# Patient Record
(demographics unavailable — no encounter records)

---

## 2024-11-07 NOTE — HISTORY OF PRESENT ILLNESS
[Disease:__________________________] : Disease: [unfilled] [Date: ____________] : Patient's last distress assessment performed on [unfilled]. [0 - No Distress] : Distress Level: 0 [50: Requires considerable assistance and frequent medical care.] : 50: Requires considerable assistance and frequent medical care. [50: Gets dressed, but lies around much of the day; no active play; able to participate in all quite play and activites.] : 50: Gets dressed, but lies around much of the day; no active play; able to participate in all quite play and activities. [ECOG Performance Status: 4 - Completely disabled. Cannot carry on any self care. Totally confined to bed or chair] : Performance Status: 4 - Completely disabled. Cannot carry on any self care. Totally confined to bed or chair [de-identified] : 89-year-old female sent to hematology service. No referring physician is stated in EM at the time of visit. 20 November 2023 Patient had weakness right side, and she was diagnosed to have a stoke and a seizure. She was given antiseizure medication vimpat. She needs full assistance with all activities of daily living including hygiene, feeding dressing and tranportation. . She was admitted to [facility] and eventually sent to rehabilitation care: [facility]. December 1, 2023, patient had an acute venous thrombosis right peroneal and right calf soleal vein; she was noted to have a thrombosis of the left peroneal and soleal vein. She was given enoxaparin and was later noted to have a hematoma in the abdominal wall and a right breast mass on imaging in October 2023. She was taking apixaban 5 mg PO BID changed in April 2024 to Eliquis 2.5 mg PO BID; and Depakote for seizure control. There is no bleeding at home on Eliquis.  The patient was also noted to have an abnormal mammogram showing abnormality of right breast.  [FreeTextEntry1] : apixaban 2.5 mg PO BID [de-identified] : 11/07/2024 patient seen in telehealth visit: telehealth visit made with one week notification. Daughter Viola provides the consent. Location of patient at home. location of provider at [facility]. prior history she was admitted to [facility] in March 2024 and [facility] 19 April 2024 .no blood transfusion. Now Eliquis 2.5 mg PO BID; blood thinner used for suspect pulmonary embolism Diagnosis of pulmonary embolism right pulmonary artery 20 April 2024.No bleeding noted while on Eliquis. She is at bed rest at home and no ambulation has been tolerated by the patient. No hospitalization

## 2024-11-07 NOTE — CONSULT LETTER
[Dear  ___] : Dear  [unfilled], [Consult Letter:] : I had the pleasure of evaluating your patient, [unfilled]. [Please see my note below.] : Please see my note below. [Consult Closing:] : Thank you very much for allowing me to participate in the care of this patient.  If you have any questions, please do not hesitate to contact me. [Sincerely,] : Sincerely, [FreeTextEntry2] : Myrna Gonzalez [address] [id number] [FreeTextEntry3] : Jens Reyes MD

## 2024-11-07 NOTE — REVIEW OF SYSTEMS
[Vision Problems] : vision problems [Diarrhea: Grade 0] : Diarrhea: Grade 0 [Confused] : confusion [Difficulty Walking] : difficulty walking [Negative] : Allergic/Immunologic [Easy Bleeding] : no tendency for easy bleeding [Easy Bruising] : no tendency for easy bruising [Swollen Glands] : no swollen glands [FreeTextEntry3] : wears glasses [de-identified] : non verbal history of dementia

## 2024-11-07 NOTE — RESULTS/DATA
[FreeTextEntry1] : review of data in EMHR including US duplex 12/01/2023 CBC WBC 5.84 HGB 13.5 g/dL MCV 79  000

## 2024-11-07 NOTE — CONSULT LETTER
[Dear  ___] : Dear  [unfilled], [Consult Letter:] : I had the pleasure of evaluating your patient, [unfilled]. [Please see my note below.] : Please see my note below. [Consult Closing:] : Thank you very much for allowing me to participate in the care of this patient.  If you have any questions, please do not hesitate to contact me. [Sincerely,] : Sincerely, [FreeTextEntry2] : Myrna Gonzalez [address] [id number] [FreeTextEntry3] : Jens eRyes MD

## 2024-11-07 NOTE — REVIEW OF SYSTEMS
[Vision Problems] : vision problems [Diarrhea: Grade 0] : Diarrhea: Grade 0 [Confused] : confusion [Difficulty Walking] : difficulty walking [Negative] : Allergic/Immunologic [Easy Bleeding] : no tendency for easy bleeding [Easy Bruising] : no tendency for easy bruising [Swollen Glands] : no swollen glands [FreeTextEntry3] : wears glasses [de-identified] : non verbal history of dementia

## 2024-11-07 NOTE — PHYSICAL EXAM
[Completely disabled. Cannot carry on any self care. Totally confined to bed or chair] : Status 4- Completely disabled. Cannot carry on any self care. Totally confined to bed or chair [Thin] : thin [Normal] : affect appropriate [de-identified] : non communicative female seen in bed [de-identified] : diffuse weakness [de-identified] : non communication [de-identified] : dementia

## 2024-11-07 NOTE — REASON FOR VISIT
[Initial Consultation] : an initial consultation for [Blood Count Assessment] : blood count assessment [Family Member] : family member [FreeTextEntry2] : pulmonary embolism April 2024

## 2024-11-07 NOTE — HISTORY OF PRESENT ILLNESS
[Disease:__________________________] : Disease: [unfilled] [Date: ____________] : Patient's last distress assessment performed on [unfilled]. [0 - No Distress] : Distress Level: 0 [50: Requires considerable assistance and frequent medical care.] : 50: Requires considerable assistance and frequent medical care. [50: Gets dressed, but lies around much of the day; no active play; able to participate in all quite play and activites.] : 50: Gets dressed, but lies around much of the day; no active play; able to participate in all quite play and activities. [ECOG Performance Status: 4 - Completely disabled. Cannot carry on any self care. Totally confined to bed or chair] : Performance Status: 4 - Completely disabled. Cannot carry on any self care. Totally confined to bed or chair [de-identified] : 89-year-old female sent to hematology service. No referring physician is stated in EM at the time of visit. 20 November 2023 Patient had weakness right side, and she was diagnosed to have a stoke and a seizure. She was given antiseizure medication vimpat. She needs full assistance with all activities of daily living including hygiene, feeding dressing and tranportation. . She was admitted to [facility] and eventually sent to rehabilitation care: [facility]. December 1, 2023, patient had an acute venous thrombosis right peroneal and right calf soleal vein; she was noted to have a thrombosis of the left peroneal and soleal vein. She was given enoxaparin and was later noted to have a hematoma in the abdominal wall and a right breast mass on imaging in October 2023. She was taking apixaban 5 mg PO BID changed in April 2024 to Eliquis 2.5 mg PO BID; and Depakote for seizure control. There is no bleeding at home on Eliquis.  The patient was also noted to have an abnormal mammogram showing abnormality of right breast.  [FreeTextEntry1] : apixaban 2.5 mg PO BID [de-identified] : 11/07/2024 patient seen in telehealth visit: telehealth visit made with one week notification. Daughter Viola provides the consent. Location of patient at home. location of provider at [facility]. prior history she was admitted to [facility] in March 2024 and [facility] 19 April 2024 .no blood transfusion. Now Eliquis 2.5 mg PO BID; blood thinner used for suspect pulmonary embolism Diagnosis of pulmonary embolism right pulmonary artery 20 April 2024.No bleeding noted while on Eliquis. She is at bed rest at home and no ambulation has been tolerated by the patient. No hospitalization

## 2024-11-07 NOTE — PHYSICAL EXAM
[Completely disabled. Cannot carry on any self care. Totally confined to bed or chair] : Status 4- Completely disabled. Cannot carry on any self care. Totally confined to bed or chair [Thin] : thin [Normal] : affect appropriate [de-identified] : non communicative female seen in bed [de-identified] : diffuse weakness [de-identified] : non communication [de-identified] : dementia

## 2024-11-07 NOTE — END OF VISIT
[Time Spent: ___ minutes] : I have spent [unfilled] minutes of time on the encounter which excludes teaching and separately reported services.
Negative

## 2024-11-07 NOTE — CONSULT LETTER
[Dear  ___] : Dear  [unfilled], [Consult Letter:] : I had the pleasure of evaluating your patient, [unfilled]. [Please see my note below.] : Please see my note below. [Consult Closing:] : Thank you very much for allowing me to participate in the care of this patient.  If you have any questions, please do not hesitate to contact me. [Sincerely,] : Sincerely, [FreeTextEntry2] : Myrna Gonzalez [address] [id number] [FreeTextEntry3] : Jens Reyes MD

## 2024-11-07 NOTE — HISTORY OF PRESENT ILLNESS
[Disease:__________________________] : Disease: [unfilled] [Date: ____________] : Patient's last distress assessment performed on [unfilled]. [0 - No Distress] : Distress Level: 0 [50: Requires considerable assistance and frequent medical care.] : 50: Requires considerable assistance and frequent medical care. [50: Gets dressed, but lies around much of the day; no active play; able to participate in all quite play and activites.] : 50: Gets dressed, but lies around much of the day; no active play; able to participate in all quite play and activities. [ECOG Performance Status: 4 - Completely disabled. Cannot carry on any self care. Totally confined to bed or chair] : Performance Status: 4 - Completely disabled. Cannot carry on any self care. Totally confined to bed or chair [de-identified] : 89-year-old female sent to hematology service. No referring physician is stated in EM at the time of visit. 20 November 2023 Patient had weakness right side, and she was diagnosed to have a stoke and a seizure. She was given antiseizure medication vimpat. She needs full assistance with all activities of daily living including hygiene, feeding dressing and tranportation. . She was admitted to [facility] and eventually sent to rehabilitation care: [facility]. December 1, 2023, patient had an acute venous thrombosis right peroneal and right calf soleal vein; she was noted to have a thrombosis of the left peroneal and soleal vein. She was given enoxaparin and was later noted to have a hematoma in the abdominal wall and a right breast mass on imaging in October 2023. She was taking apixaban 5 mg PO BID changed in April 2024 to Eliquis 2.5 mg PO BID; and Depakote for seizure control. There is no bleeding at home on Eliquis.  The patient was also noted to have an abnormal mammogram showing abnormality of right breast.  [FreeTextEntry1] : apixaban 2.5 mg PO BID [de-identified] : 11/07/2024 patient seen in telehealth visit: telehealth visit made with one week notification. Daughter Viola provides the consent. Location of patient at home. location of provider at [facility]. prior history she was admitted to [facility] in March 2024 and [facility] 19 April 2024 .no blood transfusion. Now Eliquis 2.5 mg PO BID; blood thinner used for suspect pulmonary embolism Diagnosis of pulmonary embolism right pulmonary artery 20 April 2024.No bleeding noted while on Eliquis. She is at bed rest at home and no ambulation has been tolerated by the patient. No hospitalization

## 2024-11-07 NOTE — PHYSICAL EXAM
[Completely disabled. Cannot carry on any self care. Totally confined to bed or chair] : Status 4- Completely disabled. Cannot carry on any self care. Totally confined to bed or chair [Thin] : thin [Normal] : affect appropriate [de-identified] : non communicative female seen in bed [de-identified] : diffuse weakness [de-identified] : non communication [de-identified] : dementia

## 2024-11-07 NOTE — REVIEW OF SYSTEMS
[Vision Problems] : vision problems [Diarrhea: Grade 0] : Diarrhea: Grade 0 [Confused] : confusion [Difficulty Walking] : difficulty walking [Negative] : Allergic/Immunologic [Easy Bleeding] : no tendency for easy bleeding [Easy Bruising] : no tendency for easy bruising [Swollen Glands] : no swollen glands [FreeTextEntry3] : wears glasses [de-identified] : non verbal history of dementia

## 2024-11-07 NOTE — ASSESSMENT
[Palliative Care Plan] : not applicable at this time [FreeTextEntry1] : JAKOB Greenberg is a patient newly referred to me in April 2024; she has a history of CVA now one year since occurrence in November 2023, seizure and dementia. The bilateral lower extremity deep venous thrombosis was in small distal veins in the lower extremities(soleal) and I would recommend use of anticoagulation for a total duration of three months. Given the advanced dementia and fall risk (she fell at home last month) I would avoid long term anticoagulation. She had developed chest wall hematoma while she was on enoxaparin in December 2023. current CBC shows a normal HGB. The patient is comfortable and is no in apparent pain. She has severe dementia according to the daughter. The patient answers questions to the daughter in Creole but patient does not respond to questions asked by me. The patient will be referred to breast cancer service is currently not on medication for breast disease. She has Demetia and is bed bound no bleeding near breast. Her daughter Viola cares for her at home with assistance of an 8-hour home attendant.  Daughter claims that there has been mental improvement since CVA in November 2023; according to Viola she is to occasionally speak sensibly in Creole Apixaban 2.5 mg PO BID to continue. RTC for inpatient visit in 6 months.